# Patient Record
Sex: MALE | Race: WHITE | NOT HISPANIC OR LATINO | Employment: FULL TIME | URBAN - METROPOLITAN AREA
[De-identification: names, ages, dates, MRNs, and addresses within clinical notes are randomized per-mention and may not be internally consistent; named-entity substitution may affect disease eponyms.]

---
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
## 2022-04-25 ENCOUNTER — HOSPITAL ENCOUNTER (EMERGENCY)
Facility: HOSPITAL | Age: 22
Discharge: HOME | End: 2022-04-25
Attending: EMERGENCY MEDICINE
Payer: COMMERCIAL

## 2022-04-25 ENCOUNTER — APPOINTMENT (EMERGENCY)
Dept: RADIOLOGY | Facility: HOSPITAL | Age: 22
End: 2022-04-25
Payer: COMMERCIAL

## 2022-04-25 VITALS
RESPIRATION RATE: 17 BRPM | TEMPERATURE: 98.2 F | HEART RATE: 86 BPM | OXYGEN SATURATION: 100 % | SYSTOLIC BLOOD PRESSURE: 139 MMHG | BODY MASS INDEX: 32.14 KG/M2 | HEIGHT: 66 IN | WEIGHT: 200 LBS | DIASTOLIC BLOOD PRESSURE: 92 MMHG

## 2022-04-25 DIAGNOSIS — S61.219A FINGER LACERATION: Primary | ICD-10-CM

## 2022-04-25 PROCEDURE — 99283 EMERGENCY DEPT VISIT LOW MDM: CPT

## 2022-04-25 PROCEDURE — 12001 RPR S/N/AX/GEN/TRNK 2.5CM/<: CPT | Performed by: EMERGENCY MEDICINE

## 2022-04-25 PROCEDURE — 73140 X-RAY EXAM OF FINGER(S): CPT

## 2022-04-25 PROCEDURE — 99283 EMERGENCY DEPT VISIT LOW MDM: CPT | Performed by: EMERGENCY MEDICINE

## 2022-04-25 NOTE — Clinical Note
Ember Alfred was seen and treated in our emergency department on 4/25/2022  Diagnosis:     Cole Yeh  may return to work on return date, may return to school on return date  He may return on this date: 04/27/2022         If you have any questions or concerns, please don't hesitate to call        Rick Figueredo DO    ______________________________           _______________          _______________  Hospital Representative                              Date                                Time

## 2022-04-25 NOTE — Clinical Note
Martell Taylor was seen and treated in our emergency department on 2022  Diagnosis:     [place]  may return to work on return date, may return to school on return date  He may return on this date: 2022         If you have any questions or concerns, please don't hesitate to call        Liyah Angelo, DO    ______________________________           _______________          _______________  Hospital Representative                              Date                                Time
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
## 2022-04-27 ENCOUNTER — HOSPITAL ENCOUNTER (EMERGENCY)
Facility: HOSPITAL | Age: 22
Discharge: HOME | End: 2022-04-27
Attending: EMERGENCY MEDICINE | Admitting: EMERGENCY MEDICINE
Payer: COMMERCIAL

## 2022-04-27 VITALS
TEMPERATURE: 97 F | HEIGHT: 66 IN | HEART RATE: 88 BPM | WEIGHT: 199.96 LBS | DIASTOLIC BLOOD PRESSURE: 88 MMHG | RESPIRATION RATE: 18 BRPM | SYSTOLIC BLOOD PRESSURE: 138 MMHG | OXYGEN SATURATION: 99 % | BODY MASS INDEX: 32.14 KG/M2

## 2022-04-27 DIAGNOSIS — Z51.89 ENCOUNTER FOR WOUND CARE: Primary | ICD-10-CM

## 2022-04-27 PROCEDURE — 99282 EMERGENCY DEPT VISIT SF MDM: CPT | Performed by: EMERGENCY MEDICINE

## 2022-04-27 PROCEDURE — 99283 EMERGENCY DEPT VISIT LOW MDM: CPT

## 2022-04-27 RX ORDER — GINSENG 100 MG
1 CAPSULE ORAL ONCE
Status: COMPLETED | OUTPATIENT
Start: 2022-04-27 | End: 2022-04-27

## 2022-04-27 RX ADMIN — BACITRACIN ZINC 1 SMALL APPLICATION: 500 OINTMENT TOPICAL at 20:59

## 2022-04-27 NOTE — Clinical Note
Augusto Bassettlazaramatilde was seen and treated in our emergency department on 4/27/2022  Diagnosis:     Omer Prader  may return to work on return date  He may return on this date: 05/04/2022         If you have any questions or concerns, please don't hesitate to call        Mariam Calhoun DO    ______________________________           _______________          _______________  Hospital Representative                              Date                                Time

## 2022-04-28 NOTE — ED PROVIDER NOTES
History  Chief Complaint   Patient presents with    Thumb Injury     injured thumb on 4/25 and had stitches placed  Patient was removing dressing and was concerned that he was removing the stitches, wanted to get thumb looked at again  Patient also complaining of throbbing pain and needs an updated note for work  81-year-old male with father requests wound evaluation, notes Xeroform dressing stuck to it and concerned he may have pulled out sutures  Medical Problem  Location:  Left thumb  Quality:  Sore  Severity:  Mild  Onset quality:  Gradual  Timing:  Constant  Progression:  Unchanged  Chronicity:  New  Context:  Wound care status post crush injury, nail avulsion  Relieved by:  Voicing bandage in removing  Worsened by: Movement  Ineffective treatments:  Non  Associated symptoms: no fever        None       History reviewed  No pertinent past medical history  History reviewed  No pertinent surgical history  History reviewed  No pertinent family history  I have reviewed and agree with the history as documented  E-Cigarette/Vaping    E-Cigarette Use Current Every Day User      E-Cigarette/Vaping Substances    Nicotine Yes     THC No     CBD No     Flavoring Yes     Other No     Unknown No      Social History     Tobacco Use    Smoking status: Current Every Day Smoker    Smokeless tobacco: Never Used   Vaping Use    Vaping Use: Every day    Substances: Nicotine, Flavoring   Substance Use Topics    Alcohol use: Not Currently    Drug use: Not Currently       Review of Systems   Constitutional: Negative for fever  All other systems reviewed and are negative  Physical Exam  Physical Exam  Vitals and nursing note reviewed  Constitutional:       Appearance: Normal appearance     Musculoskeletal:      Comments: Left thumb not swollen or deformed, no mallet changes, dressing easily removed, not in here in to nail bed, sutured wound intact, sutures remain, no bleeding, sensitive but not tender  Neurological:      Mental Status: He is alert  Vital Signs  ED Triage Vitals [04/27/22 2029]   Temperature Pulse Respirations Blood Pressure SpO2   (!) 97 °F (36 1 °C) 88 18 138/88 99 %      Temp Source Heart Rate Source Patient Position - Orthostatic VS BP Location FiO2 (%)   Temporal Monitor Sitting Right arm --      Pain Score       6           Vitals:    04/27/22 2029   BP: 138/88   Pulse: 88   Patient Position - Orthostatic VS: Sitting         Visual Acuity      ED Medications  Medications   bacitracin topical ointment 1 small application (has no administration in time range)       Diagnostic Studies  Results Reviewed     None                 No orders to display              Procedures  Procedures         ED Course  ED Course as of 04/27/22 2059 Wed Apr 27, 2022 2058 Procedure note:Left thumb nail avulsion redressed initially with bacitracin and then nail bed size subcutaneously of petroleum gauze is and small gauze wraps, well tolerated  Patient/father given petroleum gauze, suture removal kit, bacitracin for future wound care, voice good understanding                               SBIRT 22yo+      Most Recent Value   SBIRT (25 yo +)    In order to provide better care to our patients, we are screening all of our patients for alcohol and drug use  Would it be okay to ask you these screening questions? Yes Filed at: 04/27/2022 2047   Initial Alcohol Screen: US AUDIT-C     1  How often do you have a drink containing alcohol? 0 Filed at: 04/27/2022 2047   2  How many drinks containing alcohol do you have on a typical day you are drinking? 0 Filed at: 04/27/2022 2047   3a  Male UNDER 65: How often do you have five or more drinks on one occasion? 0 Filed at: 04/27/2022 2047   3b  FEMALE Any Age, or MALE 65+: How often do you have 4 or more drinks on one occassion? 0 Filed at: 04/27/2022 2047   Audit-C Score 0 Filed at: 04/27/2022 2047   DICK: How many times in the past year have you    Used an illegal drug or used a prescription medication for non-medical reasons? Never Filed at: 04/27/2022 2047                    MDM    Disposition  Final diagnoses:   Encounter for wound care     Time reflects when diagnosis was documented in both MDM as applicable and the Disposition within this note     Time User Action Codes Description Comment    4/27/2022  8:38 PM Nora Nunez [Z51 89] Encounter for wound care       ED Disposition     ED Disposition Condition Date/Time Comment    Discharge Stable Wed Apr 27, 2022  8:38 PM Oscar Lu discharge to home/self care  Follow-up Information    None         Patient's Medications    No medications on file       No discharge procedures on file      PDMP Review     None          ED Provider  Electronically Signed by           Margarita Hoskins DO  04/27/22 2107